# Patient Record
Sex: MALE | Race: WHITE | NOT HISPANIC OR LATINO | Employment: FULL TIME | URBAN - METROPOLITAN AREA
[De-identification: names, ages, dates, MRNs, and addresses within clinical notes are randomized per-mention and may not be internally consistent; named-entity substitution may affect disease eponyms.]

---

## 2024-11-03 ENCOUNTER — HOSPITAL ENCOUNTER (EMERGENCY)
Facility: MEDICAL CENTER | Age: 49
End: 2024-11-03
Attending: STUDENT IN AN ORGANIZED HEALTH CARE EDUCATION/TRAINING PROGRAM
Payer: COMMERCIAL

## 2024-11-03 VITALS
RESPIRATION RATE: 18 BRPM | HEIGHT: 69 IN | TEMPERATURE: 97.4 F | DIASTOLIC BLOOD PRESSURE: 87 MMHG | BODY MASS INDEX: 30.86 KG/M2 | HEART RATE: 61 BPM | SYSTOLIC BLOOD PRESSURE: 157 MMHG | OXYGEN SATURATION: 97 % | WEIGHT: 208.34 LBS

## 2024-11-03 DIAGNOSIS — S05.02XA ABRASION OF LEFT CORNEA, INITIAL ENCOUNTER: ICD-10-CM

## 2024-11-03 DIAGNOSIS — I10 PRIMARY HYPERTENSION: ICD-10-CM

## 2024-11-03 PROCEDURE — 99283 EMERGENCY DEPT VISIT LOW MDM: CPT

## 2024-11-03 RX ORDER — PROPARACAINE HYDROCHLORIDE 5 MG/ML
1 SOLUTION/ DROPS OPHTHALMIC ONCE
Status: DISCONTINUED | OUTPATIENT
Start: 2024-11-03 | End: 2024-11-03 | Stop reason: HOSPADM

## 2024-11-03 RX ORDER — ERYTHROMYCIN 5 MG/G
1 OINTMENT OPHTHALMIC 3 TIMES DAILY
Qty: 3.5 G | Refills: 0 | Status: ACTIVE | OUTPATIENT
Start: 2024-11-03

## 2024-11-03 RX ORDER — ERYTHROMYCIN 5 MG/G
1 OINTMENT OPHTHALMIC ONCE
Status: DISCONTINUED | OUTPATIENT
Start: 2024-11-03 | End: 2024-11-03 | Stop reason: HOSPADM

## 2024-11-03 RX ORDER — HYDROCODONE BITARTRATE AND ACETAMINOPHEN 5; 325 MG/1; MG/1
1 TABLET ORAL ONCE
Status: DISCONTINUED | OUTPATIENT
Start: 2024-11-03 | End: 2024-11-03 | Stop reason: HOSPADM

## 2024-11-03 NOTE — ED NOTES
Pt provided discharge paperwork.  Pt verbalized understanding of f/u care.  Pt advised to  prescriptions at pharmacy.  Pt ambulates with steady gait.

## 2024-11-03 NOTE — ED PROVIDER NOTES
"ED Provider Note    CHIEF COMPLAINT  Chief Complaint   Patient presents with    Eye Pain     Left eye pain, unable to sleep.  States feels like dirt in eye.  Denies injury and contact use.  Blurred vision to left eye, redness noted.       EXTERNAL RECORDS REVIEWED  Other None    HPI/ROS  LIMITATION TO HISTORY   Select: : None  OUTSIDE HISTORIAN(S):  Significant other      Adan Olson is a 48 y.o. male who presents with left eye pain.  Patient reports he is unable to sleep tonight as when he closes his eye he feels pain.  Pain started abruptly in the mid morning without obvious foreign body to the eye.  He was not doing anything specific when the pain started.  He does have foreign body sensation however.  Does not wear contacts.  Was having tearing all day but not having really much pain.  When he went to sleep and close his eyes he was having pain and was unable to sleep.    PAST MEDICAL HISTORY       SURGICAL HISTORY  patient denies any surgical history    FAMILY HISTORY  History reviewed. No pertinent family history.    SOCIAL HISTORY  Social History     Tobacco Use    Smoking status: Never    Smokeless tobacco: Never   Substance and Sexual Activity    Alcohol use: Yes     Comment: socially    Drug use: Never    Sexual activity: Not on file       CURRENT MEDICATIONS  Home Medications       Reviewed by Ludmila Walker R.N. (Registered Nurse) on 11/03/24 at 0245  Med List Status: Partial     Medication Last Dose Status        Patient Anthony Taking any Medications                           ALLERGIES  No Known Allergies    PHYSICAL EXAM  VITAL SIGNS: BP (!) 157/87   Pulse 61   Temp 36.3 °C (97.4 °F) (Temporal)   Resp 18   Ht 1.753 m (5' 9\")   Wt 94.5 kg (208 lb 5.4 oz)   SpO2 97%   BMI 30.77 kg/m²    Pulse oximetry interpretation: I interpret the pulse oximetry as normal.  Constitutional: Awake and alert. No acute distress.  Head: NCAT.  HEENT: Left conjunctival injection.  PERRLA.  IOP's are 11-15 " bilaterally.  Fluorescein stain with Woods lamp examination does reveal small punctate  corneal abrasions inferior to the iris.  No [place] sign, no large ulceration or laceration.  Patient subjectively found immediate relief with appropriate Paracaine dose  Neck: Grossly normal range of motion. Airway midline.  Cardiovascular: Normal heart rate, Normal rhythm.  Thorax & Lungs: No respiratory distress.  Skin: No obvious rash.  Musculoskeletal: No obvious deformity. Moves all extremities Well.  Neurologic: A&Ox3.   Psychiatric: Mood and affect are appropriate for situation.    COURSE & MEDICAL DECISION MAKING    ASSESSMENT, COURSE AND PLAN  Care Narrative:   48-year-old male no pertinent medical history here with left eye pain closing his eye and conjunctival injection without change in his vision  Afebrile and hypertensive  On exam conjunctival injection, PERRLA, extraocular muscles intact.  IOP's are 11-15 bilaterally.  Does have some small abrasion findings of the left eye.  No foreign body, no [place] sign.  No obvious foreign body when everting the eyelids.  Consideration to foreign body, rust ring, corneal abrasion, globe rupture, glaucoma  Will apply erythromycin ointment here and prescribed for home.  Recommend Tylenol Motrin for pain.  He was given additional numbing eyedrop prior to discharge.  Advise follow-up with optometrist in [place] where he lives.      ADDITIONAL PROBLEMS MANAGED    hypertension    DISPOSITION AND DISCUSSIONS  I have discussed management of the patient with the following physicians and DIANN's:  none    Discussion of management with other [facility] or appropriate source(s): None     Escalation of care considered, and ultimately not performed:acute inpatient care management, however at this time, the patient is most appropriate for outpatient management    Barriers to care at this time, including but not limited to:  None .     Decision tools and prescription drugs considered including, but  not limited to: Antibiotics for L eye abrasion .    FINAL DIAGNOSIS  1. Abrasion of left cornea, initial encounter Acute   2. Primary hypertension Chronic        Electronically signed by: Jay Kirby D.O., 11/3/2024 2:57 AM

## 2024-11-03 NOTE — ED NOTES
Pt reports pain and discomfort to left eye that is interrupting his sleep.  Pt reports no vision changes to eye, but irritation with continuous tearing  of the eye.

## 2024-11-03 NOTE — ED TRIAGE NOTES
"Chief Complaint   Patient presents with    Eye Pain     Left eye pain, unable to sleep.  States feels like dirt in eye.  Denies injury and contact use.  Blurred vision to left eye, redness noted.       Pt ambulated with steady gait to triage. Pt A&Ox4, on room air.     Pt to lobby . Pt educated on alerting staff in changes to condition. Pt verbalized understanding.      BP (!) 157/87   Pulse 61   Temp 36.3 °C (97.4 °F) (Temporal)   Resp 18   Ht 1.753 m (5' 9\")   Wt 94.5 kg (208 lb 5.4 oz)   SpO2 97%   BMI 30.77 kg/m²      DOWNTIME CHARTING  "

## 2024-11-04 RX ORDER — HYDROCODONE BITARTRATE AND ACETAMINOPHEN 5; 325 MG/1; MG/1
TABLET ORAL
Status: DISPENSED
Start: 2024-11-03 | End: 2024-11-03

## 2024-11-04 RX ORDER — PROPARACAINE HYDROCHLORIDE 5 MG/ML
SOLUTION/ DROPS OPHTHALMIC
Status: DISPENSED
Start: 2024-11-03 | End: 2024-11-03

## 2024-11-04 RX ORDER — ERYTHROMYCIN 5 MG/G
OINTMENT OPHTHALMIC
Status: DISPENSED
Start: 2024-11-03 | End: 2024-11-03